# Patient Record
Sex: FEMALE | Race: WHITE | NOT HISPANIC OR LATINO | Employment: FULL TIME | URBAN - METROPOLITAN AREA
[De-identification: names, ages, dates, MRNs, and addresses within clinical notes are randomized per-mention and may not be internally consistent; named-entity substitution may affect disease eponyms.]

---

## 2017-06-07 ENCOUNTER — TRANSCRIBE ORDERS (OUTPATIENT)
Dept: ADMINISTRATIVE | Facility: HOSPITAL | Age: 47
End: 2017-06-07

## 2017-06-07 ENCOUNTER — HOSPITAL ENCOUNTER (OUTPATIENT)
Dept: MAMMOGRAPHY | Facility: MEDICAL CENTER | Age: 47
Discharge: HOME | End: 2017-06-07
Payer: COMMERCIAL

## 2017-06-07 DIAGNOSIS — Z12.31 VISIT FOR SCREENING MAMMOGRAM: Primary | ICD-10-CM

## 2017-06-07 DIAGNOSIS — Z12.31 VISIT FOR SCREENING MAMMOGRAM: ICD-10-CM

## 2017-06-07 PROCEDURE — G0202 SCR MAMMO BI INCL CAD: HCPCS

## 2017-06-07 PROCEDURE — 77063 BREAST TOMOSYNTHESIS BI: CPT

## 2018-06-14 ENCOUNTER — TRANSCRIBE ORDERS (OUTPATIENT)
Dept: ADMINISTRATIVE | Facility: HOSPITAL | Age: 48
End: 2018-06-14

## 2018-06-14 ENCOUNTER — HOSPITAL ENCOUNTER (OUTPATIENT)
Dept: MAMMOGRAPHY | Facility: MEDICAL CENTER | Age: 48
Discharge: HOME | End: 2018-06-14
Payer: COMMERCIAL

## 2018-06-14 DIAGNOSIS — Z12.31 VISIT FOR SCREENING MAMMOGRAM: ICD-10-CM

## 2018-06-14 DIAGNOSIS — Z12.31 VISIT FOR SCREENING MAMMOGRAM: Primary | ICD-10-CM

## 2018-06-14 PROCEDURE — 77063 BREAST TOMOSYNTHESIS BI: CPT

## 2018-06-14 PROCEDURE — 77067 SCR MAMMO BI INCL CAD: CPT

## 2024-02-29 ENCOUNTER — APPOINTMENT (OUTPATIENT)
Dept: LAB | Facility: CLINIC | Age: 54
End: 2024-02-29

## 2024-02-29 ENCOUNTER — APPOINTMENT (OUTPATIENT)
Dept: URGENT CARE | Facility: CLINIC | Age: 54
End: 2024-02-29

## 2024-02-29 ENCOUNTER — TRANSCRIBE ORDERS (OUTPATIENT)
Dept: URGENT CARE | Facility: CLINIC | Age: 54
End: 2024-02-29

## 2024-02-29 DIAGNOSIS — Z02.1 PRE-EMPLOYMENT EXAMINATION: ICD-10-CM

## 2024-02-29 DIAGNOSIS — Z02.1 PRE-EMPLOYMENT EXAMINATION: Primary | ICD-10-CM

## 2024-02-29 LAB — RUBV IGG SERPL IA-ACNC: 146.7 IU/ML

## 2024-02-29 PROCEDURE — 86787 VARICELLA-ZOSTER ANTIBODY: CPT

## 2024-02-29 PROCEDURE — 36415 COLL VENOUS BLD VENIPUNCTURE: CPT

## 2024-02-29 PROCEDURE — 86762 RUBELLA ANTIBODY: CPT

## 2024-02-29 PROCEDURE — 86735 MUMPS ANTIBODY: CPT

## 2024-02-29 PROCEDURE — 86765 RUBEOLA ANTIBODY: CPT

## 2024-02-29 PROCEDURE — 86480 TB TEST CELL IMMUN MEASURE: CPT

## 2024-03-01 LAB
GAMMA INTERFERON BACKGROUND BLD IA-ACNC: 0.02 IU/ML
M TB IFN-G BLD-IMP: NEGATIVE
M TB IFN-G CD4+ BCKGRND COR BLD-ACNC: 0.01 IU/ML
M TB IFN-G CD4+ BCKGRND COR BLD-ACNC: 0.01 IU/ML
MEV IGG SER QL IA: NORMAL
MITOGEN IGNF BCKGRD COR BLD-ACNC: 9.98 IU/ML
MUV IGG SER QL IA: NORMAL
VZV IGG SER QL IA: NORMAL

## 2024-05-31 DIAGNOSIS — Z00.6 ENCOUNTER FOR EXAMINATION FOR NORMAL COMPARISON OR CONTROL IN CLINICAL RESEARCH PROGRAM: ICD-10-CM

## 2024-06-17 ENCOUNTER — APPOINTMENT (OUTPATIENT)
Dept: LAB | Age: 54
End: 2024-06-17

## 2024-06-17 DIAGNOSIS — Z00.8 HEALTH EXAMINATION IN POPULATION SURVEY: ICD-10-CM

## 2024-06-17 DIAGNOSIS — Z00.6 ENCOUNTER FOR EXAMINATION FOR NORMAL COMPARISON OR CONTROL IN CLINICAL RESEARCH PROGRAM: ICD-10-CM

## 2024-06-17 LAB
CHOLEST SERPL-MCNC: 178 MG/DL
EST. AVERAGE GLUCOSE BLD GHB EST-MCNC: 128 MG/DL
HBA1C MFR BLD: 6.1 %
HDLC SERPL-MCNC: 71 MG/DL
LDLC SERPL CALC-MCNC: 91 MG/DL (ref 0–100)
NONHDLC SERPL-MCNC: 107 MG/DL
TRIGL SERPL-MCNC: 81 MG/DL

## 2024-06-17 PROCEDURE — 36415 COLL VENOUS BLD VENIPUNCTURE: CPT

## 2024-06-17 PROCEDURE — 83036 HEMOGLOBIN GLYCOSYLATED A1C: CPT

## 2024-06-17 PROCEDURE — 80061 LIPID PANEL: CPT

## 2024-06-19 ENCOUNTER — OFFICE VISIT (OUTPATIENT)
Dept: FAMILY MEDICINE CLINIC | Facility: CLINIC | Age: 54
End: 2024-06-19
Payer: COMMERCIAL

## 2024-06-19 VITALS
RESPIRATION RATE: 17 BRPM | OXYGEN SATURATION: 98 % | WEIGHT: 197 LBS | DIASTOLIC BLOOD PRESSURE: 84 MMHG | HEART RATE: 89 BPM | TEMPERATURE: 98.4 F | HEIGHT: 66 IN | SYSTOLIC BLOOD PRESSURE: 126 MMHG | BODY MASS INDEX: 31.66 KG/M2

## 2024-06-19 DIAGNOSIS — E66.09 CLASS 1 OBESITY DUE TO EXCESS CALORIES WITH SERIOUS COMORBIDITY AND BODY MASS INDEX (BMI) OF 31.0 TO 31.9 IN ADULT: ICD-10-CM

## 2024-06-19 DIAGNOSIS — R73.01 IMPAIRED FASTING BLOOD SUGAR: ICD-10-CM

## 2024-06-19 DIAGNOSIS — E06.3 HYPOTHYROIDISM DUE TO HASHIMOTO'S THYROIDITIS: Primary | ICD-10-CM

## 2024-06-19 DIAGNOSIS — E03.8 HYPOTHYROIDISM DUE TO HASHIMOTO'S THYROIDITIS: Primary | ICD-10-CM

## 2024-06-19 PROBLEM — E66.811 CLASS 1 OBESITY DUE TO EXCESS CALORIES WITH SERIOUS COMORBIDITY AND BODY MASS INDEX (BMI) OF 31.0 TO 31.9 IN ADULT: Status: ACTIVE | Noted: 2024-06-19

## 2024-06-19 PROCEDURE — 99204 OFFICE O/P NEW MOD 45 MIN: CPT | Performed by: NURSE PRACTITIONER

## 2024-06-19 RX ORDER — IRON HEME POLYPEPTIDE/FOLIC AC 12-1MG
5000 TABLET ORAL
COMMUNITY

## 2024-06-19 RX ORDER — LEVOTHYROXINE SODIUM 0.12 MG/1
125 TABLET ORAL
COMMUNITY
End: 2024-06-19

## 2024-06-19 RX ORDER — LEVOTHYROXINE SODIUM 0.12 MG/1
125 TABLET ORAL
Qty: 102 TABLET | Refills: 3 | Status: SHIPPED | OUTPATIENT
Start: 2024-06-19

## 2024-06-19 RX ORDER — LEVOTHYROXINE SODIUM 0.12 MG/1
125 TABLET ORAL DAILY
COMMUNITY
Start: 2024-06-06 | End: 2024-06-19

## 2024-06-19 NOTE — PROGRESS NOTES
FAMILY PRACTICE OFFICE VISIT       NAME: Shital Abbasi  AGE: 54 y.o. SEX: female       : 1970        MRN: [id number]    DATE: 2024  TIME: 12:07 AM    Assessment and Plan   1. Hypothyroidism due to Hashimoto's thyroiditis  -     levothyroxine (Synthroid) 125 mcg tablet; Take 1 tablet (125 mcg total) by mouth every 7 days One day of the week 62.5 mcg  extra  -     Comprehensive metabolic panel; Future  -     Hemoglobin A1C; Future  -     TSH, 3rd generation with Free T4 reflex; Future  2. Impaired fasting blood sugar  -     Comprehensive metabolic panel; Future  -     Hemoglobin A1C; Future  3. Class 1 obesity due to excess calories with serious comorbidity and body mass index (BMI) of 31.0 to 31.9 in adult  -     Comprehensive metabolic panel; Future  -     Hemoglobin A1C; Future       There are no Patient Instructions on file for this visit.        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   HENT:  Negative for congestion, postnasal drip and rhinorrhea.    Eyes:  Negative for photophobia and visual disturbance.   Respiratory:  Negative for cough, shortness of breath and wheezing.    Cardiovascular:  Negative for chest pain and palpitations.   Gastrointestinal:  Negative for constipation, diarrhea, nausea and vomiting.   Genitourinary:  Negative for dysuria and frequency.   Musculoskeletal:  Negative for arthralgias and myalgias.   Skin:  Negative for rash.   Neurological:  Negative for  dizziness, light-headedness and headaches.   Hematological:  Negative for adenopathy.   Psychiatric/Behavioral:  Negative for dysphoric mood and sleep disturbance. The patient is not nervous/anxious and is not hyperactive.        Active Problem List     Patient Active Problem List   Diagnosis    Class 1 obesity due to excess calories with serious comorbidity and body mass index (BMI) of 31.0 to 31.9 in adult    Impaired fasting blood sugar    Hypothyroidism due to Hashimoto's thyroiditis    Beta thalassemia (HCC)    Exercise-induced asthma    Family history of pulmonary hypertension    Hypothyroidism    Menorrhagia with irregular cycle    Vitamin D deficiency         Past Medical History:  Past Medical History:   Diagnosis Date    Anemia     Beta thallasemia    Asthma     Exercise induced    Disease of thyroid gland 1999    Hypothyroidism    GERD (gastroesophageal reflux disease)     Intermittently    Obesity        Past Surgical History:  Past Surgical History:   Procedure Laterality Date    BLADDER SURGERY  2012    Bladder lift  and rectocele repair    KNEE SURGERY      Arthroscopy       Family History:  Family History   Problem Relation Age of Onset    Diabetes Mother         Type 2    COPD Mother     Completed Suicide  Father         GSW    Hypertension Brother         pulmonary HTN    COPD Brother     Thalassemia Daughter        Social History:  Social History     Socioeconomic History    Marital status: Single     Spouse name: Not on file    Number of children: Not on file    Years of education: Not on file    Highest education level: Not on file   Occupational History    Not on file   Tobacco Use    Smoking status: Never    Smokeless tobacco: Never   Vaping Use    Vaping status: Never Used   Substance and Sexual Activity    Alcohol use: Not Currently     Comment: Socially    Drug use: Never    Sexual activity: Yes     Partners: Male     Birth control/protection: Male Sterilization   Other Topics Concern     Not on file   Social History Narrative    Not on file     Social Determinants of Health     Financial Resource Strain: Not on file   Food Insecurity: Not on file   Transportation Needs: Not on file   Physical Activity: Not on file   Stress: Not on file   Social Connections: Not on file   Intimate Partner Violence: Not on file   Housing Stability: Not on file       Objective     Vitals:    06/19/24 1414   BP: 126/84   Pulse: 89   Resp: 17   Temp: 98.4 °F (36.9 °C)   SpO2: 98%     Wt Readings from Last 3 Encounters:   06/19/24 89.4 kg (197 lb)       Physical Exam  Vitals and nursing note reviewed.   Constitutional:       Appearance: Normal appearance.   HENT:      Head: Normocephalic and atraumatic.      Right Ear: Tympanic membrane, ear canal and external ear normal.      Left Ear: Tympanic membrane, ear canal and external ear normal.      Nose: Nose normal.      Mouth/Throat:      Mouth: Mucous membranes are moist.   Eyes:      Conjunctiva/sclera: Conjunctivae normal.      Pupils: Pupils are equal, round, and reactive to light.   Cardiovascular:      Rate and Rhythm: Normal rate and regular rhythm.      Pulses: Normal pulses.      Heart sounds: Normal heart sounds.   Pulmonary:      Effort: Pulmonary effort is normal.      Breath sounds: Normal breath sounds.   Abdominal:      General: Bowel sounds are normal.      Palpations: Abdomen is soft.   Musculoskeletal:         General: Normal range of motion.      Cervical back: Normal range of motion and neck supple.   Skin:     General: Skin is warm and dry.      Capillary Refill: Capillary refill takes less than 2 seconds.   Neurological:      General: No focal deficit present.      Mental Status: She is alert and oriented to person, place, and time.   Psychiatric:         Mood and Affect: Mood normal.         Behavior: Behavior normal.         Thought Content: Thought content normal.         Judgment: Judgment normal.         Pertinent Laboratory/Diagnostic  "Studies:  Lab Results   Component Value Date    BUN 24 10/04/2023    CREATININE 0.90 10/04/2023    CALCIUM 9.5 10/04/2023    K 4.2 10/04/2023    CO2 31 10/04/2023     10/04/2023     Lab Results   Component Value Date    ALT 33 10/04/2023    AST 21 10/04/2023    ALKPHOS 88 10/04/2023       No results found for: \"WBC\", \"HGB\", \"HCT\", \"MCV\", \"PLT\"    Lab Results   Component Value Date    TSH 2.64 10/04/2023       No results found for: \"CHOL\"  Lab Results   Component Value Date    TRIG 81 06/17/2024     Lab Results   Component Value Date    HDL 71 06/17/2024     Lab Results   Component Value Date    LDLCALC 91 06/17/2024     Lab Results   Component Value Date    HGBA1C 6.1 (H) 06/17/2024       Results for orders placed or performed in visit on 06/17/24   Lipid panel   Result Value Ref Range    Cholesterol 178 See Comment mg/dL    Triglycerides 81 See Comment mg/dL    HDL, Direct 71 >=50 mg/dL    LDL Calculated 91 0 - 100 mg/dL    Non-HDL-Chol (CHOL-HDL) 107 mg/dl   Hemoglobin A1C   Result Value Ref Range    Hemoglobin A1C 6.1 (H) Normal 4.0-5.6%; PreDiabetic 5.7-6.4%; Diabetic >=6.5%; Glycemic control for adults with diabetes <7.0% %     mg/dl       Orders Placed This Encounter   Procedures    Comprehensive metabolic panel    Hemoglobin A1C    TSH, 3rd generation with Free T4 reflex       ALLERGIES:  No Known Allergies    Current Medications     Current Outpatient Medications   Medication Sig Dispense Refill    ALBUTEROL IN       B Complex Vitamins (B COMPLEX 1 PO)       Cholecalciferol (Dialyvite Vitamin D 5000) 125 MCG (5000 UT) capsule 5,000 Units      Cholecalciferol 125 MCG (5000 UT) TABS Take 5,000 Units by mouth daily      Doxylamine Succinate, Sleep, (UNISOM PO)       levothyroxine (Synthroid) 125 mcg tablet Take 1 tablet (125 mcg total) by mouth every 7 days One day of the week 62.5 mcg  extra 102 tablet 3     No current facility-administered medications for this visit.         Health Maintenance "     Health Maintenance   Topic Date Due    Hepatitis C Screening  Never done    HIV Screening  Never done    Annual Physical  Never done    Cervical Cancer Screening  Never done    Colorectal Cancer Screening  Never done    DTaP,Tdap,and Td Vaccines (2 - Td or Tdap) 02/03/2019    Pneumococcal Vaccine: Pediatrics (0 to 5 Years) and At-Risk Patients (6 to 64 Years) (1 of 2 - PCV) 06/22/2025 (Originally 2/12/1976)    Breast Cancer Screening: Mammogram  12/19/2024    Depression Screening  06/19/2025    RSV Vaccine Age 60+ Years (1 - 1-dose 60+ series) 02/12/2031    Zoster Vaccine  Completed    Influenza Vaccine  Completed    COVID-19 Vaccine  Completed    RSV Vaccine age 0-20 Months  Aged Out    HIB Vaccine  Aged Out    IPV Vaccine  Aged Out    Hepatitis A Vaccine  Aged Out    Meningococcal ACWY Vaccine  Aged Out    HPV Vaccine  Aged Out     Immunization History   Administered Date(s) Administered    COVID-19 MODERNA VACC 0.5 ML IM 04/01/2022    COVID-19 Moderna Vac BIVALENT 12 Yr+ IM 0.5 ML 01/05/2023    COVID-19 PFIZER VACCINE 0.3 ML IM 12/23/2020, 01/15/2021, 10/01/2021    COVID-19 Pfizer mRNA vacc PF petra-sucrose 12 yr and older (Comirnaty) 12/14/2023    H1N1, All Formulations 11/22/2009    INFLUENZA 10/17/2014, 10/01/2016, 10/17/2017, 10/08/2018, 10/11/2019, 10/07/2020, 10/20/2021, 10/28/2022, 10/26/2023    Tdap 02/03/2009    Zoster Vaccine Recombinant 11/13/2021, 11/07/2023       Depression Screening and Follow-up Plan: Patient was screened for depression during today's encounter. They screened negative with a PHQ-2 score of 0.        NEENA Mccann

## 2024-06-22 PROBLEM — E55.9 VITAMIN D DEFICIENCY: Status: ACTIVE | Noted: 2018-07-19

## 2024-06-22 PROBLEM — Z82.49 FAMILY HISTORY OF PULMONARY HYPERTENSION: Status: ACTIVE | Noted: 2020-05-19

## 2024-06-22 PROBLEM — D56.1 BETA THALASSEMIA (HCC): Status: ACTIVE | Noted: 2019-08-26

## 2024-06-22 PROBLEM — N92.1 MENORRHAGIA WITH IRREGULAR CYCLE: Status: ACTIVE | Noted: 2023-02-15

## 2024-06-28 LAB
APOB+LDLR+PCSK9 GENE MUT ANL BLD/T: NOT DETECTED
BRCA1+BRCA2 DEL+DUP + FULL MUT ANL BLD/T: NOT DETECTED
MLH1+MSH2+MSH6+PMS2 GN DEL+DUP+FUL M: NOT DETECTED

## 2024-07-02 DIAGNOSIS — E66.09 CLASS 1 OBESITY DUE TO EXCESS CALORIES WITH SERIOUS COMORBIDITY AND BODY MASS INDEX (BMI) OF 31.0 TO 31.9 IN ADULT: Primary | ICD-10-CM

## 2024-07-03 ENCOUNTER — TELEPHONE (OUTPATIENT)
Dept: FAMILY MEDICINE CLINIC | Facility: CLINIC | Age: 54
End: 2024-07-03

## 2024-07-03 NOTE — TELEPHONE ENCOUNTER
Fax received for prior auth request for Wegovy  Key: [id number]  Please initiate prior auth. Thank you

## 2024-07-09 ENCOUNTER — TELEPHONE (OUTPATIENT)
Age: 54
End: 2024-07-09

## 2024-07-09 NOTE — TELEPHONE ENCOUNTER
I just wanted to check if I needed to do anything regarding the prior auth for this medication. The pharmacy did let me know they received it but that the office needed to initiate a prior auth. Are you able to let me know the status?   Thanks so much!   Rosa Abbasi

## 2024-07-09 NOTE — TELEPHONE ENCOUNTER
PA for WEGOVY 0.25 MG/0.5 ML SUBMITTED    Submitted via    []CMM-KEY   [x][employer]-Case ID # [id number]   []Faxed to plan   []Other website   []Phone call Case ID #     Office notes sent, clinical questions answered. Awaiting determination    Turnaround time for your insurance to make a decision on your Prior Authorization can take 7-21 business days.

## 2024-07-12 NOTE — TELEPHONE ENCOUNTER
Duplicate encounter created, please see telephone encounter from 7-9-24 regarding WEGOVY PA status.

## 2024-07-15 NOTE — TELEPHONE ENCOUNTER
PA for Semaglutide-Weight Management (WEGOVY) 0.25 MG/0.5ML Approved     Date(s) approved 7-9-2024 - 7-9-2025        Patient advised by          [x] [place] Message  [] Phone call   []LMOM  []L/M to call office as no active Communication consent on file  []Unable to leave detailed message as VM not approved on Communication consent       Pharmacy advised by    [x]Fax  []Phone call    Approval letter scanned into Media Yes

## 2024-07-31 ENCOUNTER — OFFICE VISIT (OUTPATIENT)
Dept: OBGYN CLINIC | Facility: CLINIC | Age: 54
End: 2024-07-31
Payer: COMMERCIAL

## 2024-07-31 VITALS
SYSTOLIC BLOOD PRESSURE: 138 MMHG | HEIGHT: 66 IN | BODY MASS INDEX: 31.18 KG/M2 | WEIGHT: 194 LBS | DIASTOLIC BLOOD PRESSURE: 92 MMHG

## 2024-07-31 DIAGNOSIS — Z72.3 INADEQUATE EXERCISE: ICD-10-CM

## 2024-07-31 DIAGNOSIS — Z12.31 VISIT FOR SCREENING MAMMOGRAM: ICD-10-CM

## 2024-07-31 DIAGNOSIS — Z01.419 ENCOUNTER FOR ANNUAL ROUTINE GYNECOLOGICAL EXAMINATION: Primary | ICD-10-CM

## 2024-07-31 DIAGNOSIS — E66.09 CLASS 1 OBESITY DUE TO EXCESS CALORIES WITH SERIOUS COMORBIDITY AND BODY MASS INDEX (BMI) OF 31.0 TO 31.9 IN ADULT: Primary | ICD-10-CM

## 2024-07-31 DIAGNOSIS — E58 DIETARY CALCIUM DEFICIENCY: ICD-10-CM

## 2024-07-31 PROCEDURE — S0610 ANNUAL GYNECOLOGICAL EXAMINA: HCPCS | Performed by: OBSTETRICS & GYNECOLOGY

## 2024-07-31 RX ORDER — SEMAGLUTIDE 0.5 MG/.5ML
INJECTION, SOLUTION SUBCUTANEOUS
Qty: 2 ML | Refills: 0 | Status: SHIPPED | OUTPATIENT
Start: 2024-07-31

## 2024-07-31 NOTE — PROGRESS NOTES
NEW PATIENT    Pt is a54 y.o.  ,menopausal, who presents for preventive care  Partner same ; lifetime  1 partner         safe sexual practices followed: yes     does feel safe in relationship:yes    Dairy: 1 c milk 3-4x/week, 1 cheese daily  Vitamin D: takes supplement  Exercise: 3x/week  Pap: 2/15/2023-wnl, HRHPV neg, repeat   Mammogram: 2023-wnl, repeat rx given  Colonoscopy: wnl, repeat 10 years  DEXA: not indicated  safety at home:  yes  tobacco use No    Past Medical History:   Diagnosis Date    Anemia     Beta thallasemia    Asthma     Exercise induced    Disease of thyroid gland     Hypothyroidism    GERD (gastroesophageal reflux disease)     Intermittently    Obesity     Pap smear for cervical cancer screening     denies h.o abnormal; 2023-wnl, HRHPV neg       Past Surgical History:   Procedure Laterality Date    BLADDER SURGERY      Bladder lift  and rectocele repair    COLONOSCOPY      wnl, repeat 10 years    KNEE SURGERY Left     Arthroscopy    WISDOM TOOTH EXTRACTION         Ob Hx:   OB History    Para Term  AB Living   2 2 2     2   SAB IAB Ectopic Multiple Live Births           2      # Outcome Date GA Lbr Luciano/2nd Weight Sex Type Anes PTL Lv   2 Term     F Vag-Spont   JUAN DIEGO   1 Term     F Vag-Spont   JUAN DIEGO      Obstetric Comments   Menarche: 13      Menopause: 54           Current Outpatient Medications:     ALBUTEROL IN, , Disp: , Rfl:     B Complex Vitamins (B COMPLEX 1 PO), , Disp: , Rfl:     Cholecalciferol (Dialyvite Vitamin D 5000) 125 MCG (5000 UT) capsule, 5,000 Units, Disp: , Rfl:     Doxylamine Succinate, Sleep, (UNISOM PO), , Disp: , Rfl:     levothyroxine (Synthroid) 125 mcg tablet, Take 1 tablet (125 mcg total) by mouth every 7 days One day of the week 62.5 mcg  extra, Disp: 102 tablet, Rfl: 3    Semaglutide-Weight Management (WEGOVY) 0.25 MG/0.5ML, Inject 0.5 mL (0.25 mg total) under the skin once a week, Disp: 2 mL, Rfl: 0    No Known  "Allergies    Social History     Socioeconomic History    Marital status: /Civil Union     Spouse name: Anmol    Number of children: 2    Years of education: None    Highest education level: Bachelor's degree (e.g., BA, AB, BS)   Occupational History    Occupation: Nurse   Tobacco Use    Smoking status: Never    Smokeless tobacco: Never   Vaping Use    Vaping status: Never Used   Substance and Sexual Activity    Alcohol use: Not Currently     Alcohol/week: 0.0 - 1.0 standard drinks of alcohol     Comment: Socially-once monthly    Drug use: Never    Sexual activity: Yes     Partners: Male     Birth control/protection: Male Sterilization     Comment: lifetime partners: 1   Other Topics Concern    None   Social History Narrative    [employer]: [employer]    Accepts blood products        Exercise: 3x/week    Calcium: 1 c milk 3-4x/week, 1 cheese daily     Social Determinants of Health     Financial Resource Strain: Not on file   Food Insecurity: Not on file   Transportation Needs: Not on file   Physical Activity: Not on file   Stress: Not on file   Social Connections: Not on file   Intimate Partner Violence: Not on file   Housing Stability: Not on file       Family History   Problem Relation Age of Onset    Diabetes Mother         Type 2    COPD Mother     Completed Suicide  Father         GSW    Hashimoto's thyroiditis Sister     Hypertension Brother         pulmonary HTN    COPD Brother     Breast cancer Maternal Grandmother 80    No Known Problems Maternal Grandfather     No Known Problems Paternal Grandmother     No Known Problems Paternal Grandfather     Thalassemia Daughter     Thalassemia Daughter     Ovarian cancer Neg Hx     Colon cancer Neg Hx        Blood pressure 138/92, height 5' 6\" (1.676 m), weight 88 kg (194 lb), last menstrual period 07/11/2023. and Body mass index is 31.31 kg/m².    Physical Exam  Constitutional:       General: She is not in acute distress.     Appearance: Normal appearance. " She is well-developed. She is obese. She is not ill-appearing.   HENT:      Head: Normocephalic and atraumatic.   Eyes:      Extraocular Movements: Extraocular movements intact.      Conjunctiva/sclera: Conjunctivae normal.   Neck:      Thyroid: No thyromegaly.      Trachea: No tracheal deviation.   Cardiovascular:      Rate and Rhythm: Normal rate and regular rhythm.      Heart sounds: Normal heart sounds.   Pulmonary:      Effort: Pulmonary effort is normal. No respiratory distress.      Breath sounds: Normal breath sounds. No stridor. No wheezing or rales.   Abdominal:      General: Bowel sounds are normal. There is no distension.      Palpations: Abdomen is soft. There is no mass.      Tenderness: There is no abdominal tenderness. There is no guarding or rebound.      Hernia: No hernia is present.   Musculoskeletal:         General: No tenderness. Normal range of motion.      Cervical back: Normal range of motion and neck supple.   Lymphadenopathy:      Cervical: No cervical adenopathy.   Skin:     General: Skin is warm.      Findings: No erythema or rash.   Neurological:      Mental Status: She is alert and oriented to person, place, and time.   Psychiatric:         Mood and Affect: Mood normal.         Behavior: Behavior normal.         Thought Content: Thought content normal.         Judgment: Judgment normal.          Breasts: breasts appear normal, no suspicious masses, no skin or nipple changes or axillary nodes, symmetric fibrous changes in both upper outer quadrants.      vulva: normal external genitalia for age and no lesions, masses, epithelial changes, or exudate  vagina: color pale and rugae  flattening of rugae  cervix: parous and no lesions   uterus: NSSC, AF, NT, mobile  adnexa: no masses or tenderness  rectum: no masses or nodularity    A/P:  Pt is a 54 y.o.  with       Shital was seen today for gynecologic exam.    Diagnoses and all orders for this visit:    Encounter for annual routine  gynecological examination  -normal examination  -pap up to date  -colonoscopy up to date    Visit for screening mammogram  -     Mammo screening bilateral w 3d & cad; Future    Dietary calcium deficiency  Patient advised recommendation of daily dietary calcium of 1200 mg calcium.     Inadequate exercise  Patient advised recommendation of exercise 5 times per week for 30 minutes.    BMI 31.0-31.9,adult  Patient advised recommendation of BMI to be between 19-25.

## 2024-08-30 DIAGNOSIS — E66.09 CLASS 1 OBESITY DUE TO EXCESS CALORIES WITH SERIOUS COMORBIDITY AND BODY MASS INDEX (BMI) OF 31.0 TO 31.9 IN ADULT: ICD-10-CM

## 2024-09-03 NOTE — TELEPHONE ENCOUNTER
Requested medication(s) are due for refill today: Yes  Patient has already received a courtesy refill: No  Other reason request has been forwarded to provider: PLEASE INCREASE DOSE

## 2024-09-04 RX ORDER — SEMAGLUTIDE 0.5 MG/.5ML
INJECTION, SOLUTION SUBCUTANEOUS
Qty: 2 ML | Refills: 3 | Status: SHIPPED | OUTPATIENT
Start: 2024-09-04

## 2024-09-14 ENCOUNTER — APPOINTMENT (OUTPATIENT)
Dept: LAB | Facility: HOSPITAL | Age: 54
End: 2024-09-14
Payer: COMMERCIAL

## 2024-09-14 DIAGNOSIS — E03.8 HYPOTHYROIDISM DUE TO HASHIMOTO'S THYROIDITIS: ICD-10-CM

## 2024-09-14 DIAGNOSIS — E06.3 HYPOTHYROIDISM DUE TO HASHIMOTO'S THYROIDITIS: ICD-10-CM

## 2024-09-14 DIAGNOSIS — E66.09 CLASS 1 OBESITY DUE TO EXCESS CALORIES WITH SERIOUS COMORBIDITY AND BODY MASS INDEX (BMI) OF 31.0 TO 31.9 IN ADULT: ICD-10-CM

## 2024-09-14 DIAGNOSIS — R73.01 IMPAIRED FASTING BLOOD SUGAR: ICD-10-CM

## 2024-09-14 LAB
ALBUMIN SERPL BCG-MCNC: 4.3 G/DL (ref 3.5–5)
ALP SERPL-CCNC: 96 U/L (ref 34–104)
ALT SERPL W P-5'-P-CCNC: 12 U/L (ref 7–52)
ANION GAP SERPL CALCULATED.3IONS-SCNC: 7 MMOL/L (ref 4–13)
AST SERPL W P-5'-P-CCNC: 15 U/L (ref 13–39)
BILIRUB SERPL-MCNC: 0.76 MG/DL (ref 0.2–1)
BUN SERPL-MCNC: 16 MG/DL (ref 5–25)
CALCIUM SERPL-MCNC: 9.9 MG/DL (ref 8.4–10.2)
CHLORIDE SERPL-SCNC: 103 MMOL/L (ref 96–108)
CO2 SERPL-SCNC: 29 MMOL/L (ref 21–32)
CREAT SERPL-MCNC: 0.88 MG/DL (ref 0.6–1.3)
EST. AVERAGE GLUCOSE BLD GHB EST-MCNC: 137 MG/DL
GFR SERPL CREATININE-BSD FRML MDRD: 75 ML/MIN/1.73SQ M
GLUCOSE P FAST SERPL-MCNC: 87 MG/DL (ref 65–99)
HBA1C MFR BLD: 6.4 %
POTASSIUM SERPL-SCNC: 4.1 MMOL/L (ref 3.5–5.3)
PROT SERPL-MCNC: 7.5 G/DL (ref 6.4–8.4)
SODIUM SERPL-SCNC: 139 MMOL/L (ref 135–147)
T4 FREE SERPL-MCNC: 1.57 NG/DL (ref 0.61–1.12)
TSH SERPL DL<=0.05 MIU/L-ACNC: 0.05 UIU/ML (ref 0.45–4.5)

## 2024-09-14 PROCEDURE — 84443 ASSAY THYROID STIM HORMONE: CPT

## 2024-09-14 PROCEDURE — 83036 HEMOGLOBIN GLYCOSYLATED A1C: CPT

## 2024-09-14 PROCEDURE — 80053 COMPREHEN METABOLIC PANEL: CPT

## 2024-09-14 PROCEDURE — 84439 ASSAY OF FREE THYROXINE: CPT

## 2024-09-14 PROCEDURE — 36415 COLL VENOUS BLD VENIPUNCTURE: CPT

## 2024-09-16 ENCOUNTER — TELEPHONE (OUTPATIENT)
Dept: ADMINISTRATIVE | Facility: OTHER | Age: 54
End: 2024-09-16

## 2024-09-16 ENCOUNTER — OFFICE VISIT (OUTPATIENT)
Dept: FAMILY MEDICINE CLINIC | Facility: CLINIC | Age: 54
End: 2024-09-16
Payer: COMMERCIAL

## 2024-09-16 VITALS
SYSTOLIC BLOOD PRESSURE: 118 MMHG | RESPIRATION RATE: 17 BRPM | OXYGEN SATURATION: 99 % | BODY MASS INDEX: 30.05 KG/M2 | HEIGHT: 66 IN | HEART RATE: 94 BPM | TEMPERATURE: 97.8 F | WEIGHT: 187 LBS | DIASTOLIC BLOOD PRESSURE: 80 MMHG

## 2024-09-16 DIAGNOSIS — R73.01 IMPAIRED FASTING BLOOD SUGAR: ICD-10-CM

## 2024-09-16 DIAGNOSIS — E03.8 HYPOTHYROIDISM DUE TO HASHIMOTO'S THYROIDITIS: ICD-10-CM

## 2024-09-16 DIAGNOSIS — Z00.00 ANNUAL PHYSICAL EXAM: Primary | ICD-10-CM

## 2024-09-16 DIAGNOSIS — E06.3 HYPOTHYROIDISM DUE TO HASHIMOTO'S THYROIDITIS: ICD-10-CM

## 2024-09-16 DIAGNOSIS — E66.09 CLASS 1 OBESITY DUE TO EXCESS CALORIES WITH SERIOUS COMORBIDITY AND BODY MASS INDEX (BMI) OF 31.0 TO 31.9 IN ADULT: ICD-10-CM

## 2024-09-16 PROCEDURE — 99396 PREV VISIT EST AGE 40-64: CPT | Performed by: NURSE PRACTITIONER

## 2024-09-16 RX ORDER — SEMAGLUTIDE 1 MG/.5ML
INJECTION, SOLUTION SUBCUTANEOUS
Qty: 2 ML | Refills: 2 | Status: SHIPPED | OUTPATIENT
Start: 2024-09-16

## 2024-09-16 NOTE — TELEPHONE ENCOUNTER
----- Message from Joanna SCOTT sent at 9/16/2024  9:02 AM EDT -----  Regarding: Care Gap Request  09/16/24 9:02 AM    Hello, our patient above has had Mammogram completed/performed. Please assist in updating the patient chart by pulling the Care Everywhere (CE) document. The date of service is 12/19/2023.     Thank you,  JACKLYN Copeland [facility]

## 2024-09-16 NOTE — TELEPHONE ENCOUNTER
Upon review of the In Basket request we were able to locate, review, and update the patient chart as requested for CRC: Colonoscopy.    Any additional questions or concerns should be emailed to the Practice Liaisons via the appropriate education email address, please do not reply via In Basket.    Thank you  Jayna Figueroa MA   [facility]

## 2024-09-16 NOTE — TELEPHONE ENCOUNTER
----- Message from Joanna SCOTT sent at 9/16/2024  9:04 AM EDT -----  Regarding: Care Gap Request  09/16/24 9:04 AM    Hello, our patient above has had CRC: Colonoscopy completed/performed. Please assist in updating the patient chart by pulling the Care Everywhere (CE) document. The date of service is 03/29/2021.     Thank you,  JACKLYN Copeland PG

## 2024-09-16 NOTE — TELEPHONE ENCOUNTER
Upon review of the In Basket request we were able to locate, review, and update the patient chart as requested for Mammogram.    Any additional questions or concerns should be emailed to the Practice Liaisons via the appropriate education email address, please do not reply via In Basket.    Thank you  Jayna Figueroa MA   [facility]

## 2024-09-16 NOTE — PROGRESS NOTES
Adult Annual Physical  Name: Shital Abbasi      : 1970      MRN: [id number]  Encounter Provider: NEENA Mccann  Encounter Date: 2024   Encounter department: [facility]    Assessment & Plan  Annual physical exam         Class 1 obesity due to excess calories with serious comorbidity and body mass index (BMI) of 31.0 to 31.9 in adult    Orders:    Semaglutide-Weight Management (Wegovy) 1 MG/0.5ML; Inject 1 mg under the skin weekly    TSH, 3rd generation with Free T4 reflex; Future    Hemoglobin A1C; Future    Impaired fasting blood sugar    Orders:    TSH, 3rd generation with Free T4 reflex; Future    Hemoglobin A1C; Future    Hypothyroidism due to Hashimoto's thyroiditis    Orders:    TSH, 3rd generation with Free T4 reflex; Future    Hemoglobin A1C; Future    Immunizations and preventive care screenings were discussed with patient today. Appropriate education was printed on patient's after visit summary.    Counseling:  Alcohol/drug use: discussed moderation in alcohol intake, the recommendations for healthy alcohol use, and avoidance of illicit drug use.  Dental Health: discussed importance of regular tooth brushing, flossing, and dental visits.  Injury prevention: discussed safety/seat belts, safety helmets, smoke detectors, carbon dioxide detectors, and smoking near bedding or upholstery.  Sexual health: discussed sexually transmitted diseases, partner selection, use of condoms, avoidance of unintended pregnancy, and contraceptive alternatives.  Exercise: the importance of regular exercise/physical activity was discussed. Recommend exercise 3-5 times per week for at least 30 minutes.       Depression Screening and Follow-up Plan: Patient was screened for depression during today's encounter. They screened negative with a PHQ-2 score of 0.        History of Present Illness     Adult Annual Physical:  Patient presents for annual physical. Here for wellness exam  .     Diet  and Physical Activity:  - Diet/Nutrition: well balanced diet.  - Exercise: 3-4 times a week on average.    Depression Screening:  - PHQ-2 Score: 0    General Health:  - Sleep: sleeps well.  - Hearing: normal hearing right ear and normal hearing left ear.  - Vision: no vision problems.  - Dental: regular dental visits and brushes teeth once daily.    /GYN Health:  - Follows with GYN: yes.   - History of STDs: no    Advanced Care Planning:  - Has an advanced directive?: no    - Has a durable medical POA?: no    - ACP document given to patient?: no      Review of Systems   Constitutional:  Negative for fatigue and fever.   HENT:  Negative for congestion, postnasal drip and rhinorrhea.    Eyes:  Negative for photophobia and visual disturbance.   Respiratory:  Negative for cough, shortness of breath and wheezing.    Cardiovascular:  Negative for chest pain and palpitations.   Gastrointestinal:  Negative for constipation, diarrhea, nausea and vomiting.   Genitourinary:  Negative for dysuria and frequency.   Musculoskeletal:  Negative for arthralgias and myalgias.   Skin:  Negative for rash.   Neurological:  Negative for dizziness, light-headedness and headaches.   Hematological:  Negative for adenopathy.   Psychiatric/Behavioral:  Negative for dysphoric mood and sleep disturbance. The patient is not nervous/anxious.      Pertinent Medical History         Medical History Reviewed by provider this encounter:  Tobacco  Allergies  Meds  Problems  Med Hx  Surg Hx  Fam Hx       Past Medical History   Past Medical History:   Diagnosis Date    Anemia     Beta thallasemia    Asthma     Exercise induced    Disease of thyroid gland 1999    Hypothyroidism    GERD (gastroesophageal reflux disease)     Intermittently    Obesity     Pap smear for cervical cancer screening     denies h.o abnormal; 2/2023-wnl, HRHPV neg     Past Surgical History:   Procedure Laterality Date    BLADDER SURGERY  2012    Bladder lift  and rectocele  repair    COLONOSCOPY      2021-wnl, repeat 10 years    KNEE SURGERY Left     Arthroscopy    WISDOM TOOTH EXTRACTION       Family History   Problem Relation Age of Onset    Diabetes Mother         Type 2    COPD Mother     Completed Suicide  Father         GSW    Hashimoto's thyroiditis Sister     Hypertension Brother         pulmonary HTN    COPD Brother     Breast cancer Maternal Grandmother 80    No Known Problems Maternal Grandfather     No Known Problems Paternal Grandmother     No Known Problems Paternal Grandfather     Thalassemia Daughter     Thalassemia Daughter     Ovarian cancer Neg Hx     Colon cancer Neg Hx      Current Outpatient Medications on File Prior to Visit   Medication Sig Dispense Refill    ALBUTEROL IN       B Complex Vitamins (B COMPLEX 1 PO)       Cholecalciferol (Dialyvite Vitamin D 5000) 125 MCG (5000 UT) capsule 5,000 Units      Doxylamine Succinate, Sleep, (UNISOM PO)       levothyroxine (Synthroid) 125 mcg tablet Take 1 tablet (125 mcg total) by mouth every 7 days One day of the week 62.5 mcg  extra 102 tablet 3    [DISCONTINUED] Semaglutide-Weight Management (Wegovy) 0.5 MG/0.5ML Inject 0.5 mg under the skin weekly 2 mL 3     No current facility-administered medications on file prior to visit.   No Known Allergies   Current Outpatient Medications on File Prior to Visit   Medication Sig Dispense Refill    ALBUTEROL IN       B Complex Vitamins (B COMPLEX 1 PO)       Cholecalciferol (Dialyvite Vitamin D 5000) 125 MCG (5000 UT) capsule 5,000 Units      Doxylamine Succinate, Sleep, (UNISOM PO)       levothyroxine (Synthroid) 125 mcg tablet Take 1 tablet (125 mcg total) by mouth every 7 days One day of the week 62.5 mcg  extra 102 tablet 3    [DISCONTINUED] Semaglutide-Weight Management (Wegovy) 0.5 MG/0.5ML Inject 0.5 mg under the skin weekly 2 mL 3     No current facility-administered medications on file prior to visit.      Social History     Tobacco Use    Smoking status: Never     "Smokeless tobacco: Never   Vaping Use    Vaping status: Never Used   Substance and Sexual Activity    Alcohol use: Not Currently     Alcohol/week: 0.0 - 1.0 standard drinks of alcohol     Comment: Socially-once monthly    Drug use: Never    Sexual activity: Yes     Partners: Male     Birth control/protection: Male Sterilization     Comment: lifetime partners: 1       Objective     /80 (BP Location: Left arm, Patient Position: Sitting, Cuff Size: Standard)   Pulse 94   Temp 97.8 °F (36.6 °C) (Tympanic)   Resp 17   Ht 5' 6.46\" (1.688 m)   Wt 84.8 kg (187 lb)   LMP 07/11/2023 (Exact Date)   SpO2 99%   BMI 29.77 kg/m²     Physical Exam  Vitals and nursing note reviewed.   Constitutional:       Appearance: Normal appearance.   HENT:      Head: Normocephalic and atraumatic.      Right Ear: Tympanic membrane, ear canal and external ear normal.      Left Ear: Tympanic membrane, ear canal and external ear normal.      Nose: Nose normal.      Mouth/Throat:      Mouth: Mucous membranes are moist.   Eyes:      Conjunctiva/sclera: Conjunctivae normal.   Cardiovascular:      Rate and Rhythm: Normal rate and regular rhythm.      Pulses: Normal pulses.      Heart sounds: Normal heart sounds.   Pulmonary:      Effort: Pulmonary effort is normal.   Abdominal:      General: Bowel sounds are normal.      Palpations: Abdomen is soft.   Musculoskeletal:         General: Normal range of motion.      Cervical back: Normal range of motion and neck supple.   Skin:     General: Skin is warm and dry.      Capillary Refill: Capillary refill takes less than 2 seconds.   Neurological:      General: No focal deficit present.      Mental Status: She is alert and oriented to person, place, and time.   Psychiatric:         Mood and Affect: Mood normal.         Behavior: Behavior normal.         Thought Content: Thought content normal.         Judgment: Judgment normal.       Administrative Statements   I have spent a total time of 20 " minutes in caring for this patient on the day of the visit/encounter including Diagnostic results, Prognosis, Risks and benefits of tx options, Instructions for management, Patient and family education, Importance of tx compliance, Risk factor reductions, Impressions, Counseling / Coordination of care, Documenting in the medical record, Reviewing / ordering tests, medicine, procedures  , and Obtaining or reviewing history  .

## 2024-09-16 NOTE — PATIENT INSTRUCTIONS
"Patient Education     Routine physical for adults   The Basics   Written by the doctors and editors at [facility]   What is a physical? -- A physical is a routine visit, or \"check-up,\" with your doctor. You might also hear it called a \"wellness visit\" or \"preventive visit.\"  During each visit, the doctor will:   Ask about your physical and mental health   Ask about your habits, behaviors, and lifestyle   Do an exam   Give you vaccines if needed   Talk to you about any medicines you take   Give advice about your health   Answer your questions  Getting regular check-ups is an important part of taking care of your health. It can help your doctor find and treat any problems you have. But it's also important for preventing health problems.  A routine physical is different from a \"sick visit.\" A sick visit is when you see a doctor because of a health concern or problem. Since physicals are scheduled ahead of time, you can think about what you want to ask the doctor.  How often should I get a physical? -- It depends on your age and health. In general, for people age 21 years and older:   If you are younger than 50 years, you might be able to get a physical every 3 years.   If you are 50 years or older, your doctor might recommend a physical every year.  If you have an ongoing health condition, like diabetes or high blood pressure, your doctor will probably want to see you more often.  What happens during a physical? -- In general, each visit will include:   Physical exam - The doctor or nurse will check your height, weight, heart rate, and blood pressure. They will also look at your eyes and ears. They will ask about how you are feeling and whether you have any symptoms that bother you.   Medicines - It's a good idea to bring a list of all the medicines you take to each doctor visit. Your doctor will talk to you about your medicines and answer any questions. Tell them if you are having any side effects that bother you. You " "should also tell them if you are having trouble paying for any of your medicines.   Habits and behaviors - This includes:   Your diet   Your exercise habits   Whether you smoke, drink alcohol, or use drugs   Whether you are sexually active   Whether you feel safe at home  Your doctor will talk to you about things you can do to improve your health and lower your risk of health problems. They will also offer help and support. For example, if you want to quit smoking, they can give you advice and might prescribe medicines. If you want to improve your diet or get more physical activity, they can help you with this, too.   Lab tests, if needed - The tests you get will depend on your age and situation. For example, your doctor might want to check your:   Cholesterol   Blood sugar   Iron level   Vaccines - The recommended vaccines will depend on your age, health, and what vaccines you already had. Vaccines are very important because they can prevent certain serious or deadly infections.   Discussion of screening - \"Screening\" means checking for diseases or other health problems before they cause symptoms. Your doctor can recommend screening based on your age, risk, and preferences. This might include tests to check for:   Cancer, such as breast, prostate, cervical, ovarian, colorectal, prostate, lung, or skin cancer   Sexually transmitted infections, such as chlamydia and gonorrhea   Mental health conditions like depression and anxiety  Your doctor will talk to you about the different types of screening tests. They can help you decide which screenings to have. They can also explain what the results might mean.   Answering questions - The physical is a good time to ask the doctor or nurse questions about your health. If needed, they can refer you to other doctors or specialists, too.  Adults older than 65 years often need other care, too. As you get older, your doctor will talk to you about:   How to prevent falling at " home   Hearing or vision tests   Memory testing   How to take your medicines safely   Making sure that you have the help and support you need at home  All topics are updated as new evidence becomes available and our peer review process is complete.  This topic retrieved from [employer] on: May 02, 2024.  Topic 357830 Version 1.0  Release: 32.4.3 - C32.122  © 2024 UpToDate, Inc. and/or its affiliates. All rights reserved.  Consumer Information Use and Disclaimer   Disclaimer: This generalized information is a limited summary of diagnosis, treatment, and/or medication information. It is not meant to be comprehensive and should be used as a tool to help the user understand and/or assess potential diagnostic and treatment options. It does NOT include all information about conditions, treatments, medications, side effects, or risks that may apply to a specific patient. It is not intended to be medical advice or a substitute for the medical advice, diagnosis, or treatment of a health care provider based on the health care provider's examination and assessment of a patient's specific and unique circumstances. Patients must speak with a health care provider for complete information about their health, medical questions, and treatment options, including any risks or benefits regarding use of medications. This information does not endorse any treatments or medications as safe, effective, or approved for treating a specific patient. UpToDate, Inc. and its affiliates disclaim any warranty or liability relating to this information or the use thereof.The use of this information is governed by the Terms of Use, available at https://www.[employer]uwer.com/en/know/clinical-effectiveness-terms. 2024© UpToDate, Inc. and its affiliates and/or licensors. All rights reserved.  Copyright   © 2024 UpToDate, Inc. and/or its affiliates. All rights reserved.

## 2024-09-16 NOTE — ASSESSMENT & PLAN NOTE
Orders:    Semaglutide-Weight Management (Wegovy) 1 MG/0.5ML; Inject 1 mg under the skin weekly    TSH, 3rd generation with Free T4 reflex; Future    Hemoglobin A1C; Future

## 2024-10-16 DIAGNOSIS — E66.09 CLASS 1 OBESITY DUE TO EXCESS CALORIES WITH SERIOUS COMORBIDITY AND BODY MASS INDEX (BMI) OF 31.0 TO 31.9 IN ADULT: ICD-10-CM

## 2024-10-16 DIAGNOSIS — E66.811 CLASS 1 OBESITY DUE TO EXCESS CALORIES WITH SERIOUS COMORBIDITY AND BODY MASS INDEX (BMI) OF 31.0 TO 31.9 IN ADULT: ICD-10-CM

## 2024-10-17 RX ORDER — SEMAGLUTIDE 1 MG/.5ML
INJECTION, SOLUTION SUBCUTANEOUS
Qty: 2 ML | Refills: 0 | Status: SHIPPED | OUTPATIENT
Start: 2024-10-17

## 2024-10-17 NOTE — TELEPHONE ENCOUNTER
Requested medication(s) are due for refill today: Yes  Patient has already received a courtesy refill: No  Other reason request has been forwarded to provider: per protocol     Patient would jose antonio to stay on current dose.

## 2024-11-27 DIAGNOSIS — E66.811 CLASS 1 OBESITY DUE TO EXCESS CALORIES WITH SERIOUS COMORBIDITY AND BODY MASS INDEX (BMI) OF 31.0 TO 31.9 IN ADULT: Primary | ICD-10-CM

## 2024-11-27 DIAGNOSIS — E66.09 CLASS 1 OBESITY DUE TO EXCESS CALORIES WITH SERIOUS COMORBIDITY AND BODY MASS INDEX (BMI) OF 31.0 TO 31.9 IN ADULT: Primary | ICD-10-CM

## 2024-11-27 RX ORDER — SEMAGLUTIDE 1.7 MG/.75ML
INJECTION, SOLUTION SUBCUTANEOUS
Qty: 3 ML | Refills: 0 | Status: SHIPPED | OUTPATIENT
Start: 2024-11-27

## 2024-12-12 DIAGNOSIS — E66.09 CLASS 1 OBESITY DUE TO EXCESS CALORIES WITH SERIOUS COMORBIDITY AND BODY MASS INDEX (BMI) OF 31.0 TO 31.9 IN ADULT: ICD-10-CM

## 2024-12-12 DIAGNOSIS — E66.811 CLASS 1 OBESITY DUE TO EXCESS CALORIES WITH SERIOUS COMORBIDITY AND BODY MASS INDEX (BMI) OF 31.0 TO 31.9 IN ADULT: ICD-10-CM

## 2024-12-13 RX ORDER — SEMAGLUTIDE 1.7 MG/.75ML
INJECTION, SOLUTION SUBCUTANEOUS
Qty: 3 ML | Refills: 0 | Status: SHIPPED | OUTPATIENT
Start: 2024-12-13

## 2024-12-17 ENCOUNTER — APPOINTMENT (OUTPATIENT)
Dept: LAB | Facility: AMBULARY SURGERY CENTER | Age: 54
End: 2024-12-17
Payer: COMMERCIAL

## 2024-12-17 DIAGNOSIS — R73.01 IMPAIRED FASTING BLOOD SUGAR: ICD-10-CM

## 2024-12-17 DIAGNOSIS — E66.09 CLASS 1 OBESITY DUE TO EXCESS CALORIES WITH SERIOUS COMORBIDITY AND BODY MASS INDEX (BMI) OF 31.0 TO 31.9 IN ADULT: ICD-10-CM

## 2024-12-17 DIAGNOSIS — E66.811 CLASS 1 OBESITY DUE TO EXCESS CALORIES WITH SERIOUS COMORBIDITY AND BODY MASS INDEX (BMI) OF 31.0 TO 31.9 IN ADULT: ICD-10-CM

## 2024-12-17 DIAGNOSIS — E06.3 HYPOTHYROIDISM DUE TO HASHIMOTO'S THYROIDITIS: ICD-10-CM

## 2024-12-17 LAB
EST. AVERAGE GLUCOSE BLD GHB EST-MCNC: 117 MG/DL
HBA1C MFR BLD: 5.7 %
T4 FREE SERPL-MCNC: 1.4 NG/DL (ref 0.61–1.12)
TSH SERPL DL<=0.05 MIU/L-ACNC: 0.05 UIU/ML (ref 0.45–4.5)

## 2024-12-17 PROCEDURE — 36415 COLL VENOUS BLD VENIPUNCTURE: CPT

## 2024-12-17 PROCEDURE — 84439 ASSAY OF FREE THYROXINE: CPT

## 2024-12-17 PROCEDURE — 84443 ASSAY THYROID STIM HORMONE: CPT

## 2024-12-17 PROCEDURE — 83036 HEMOGLOBIN GLYCOSYLATED A1C: CPT

## 2024-12-19 ENCOUNTER — HOSPITAL ENCOUNTER (OUTPATIENT)
Dept: MAMMOGRAPHY | Facility: HOSPITAL | Age: 54
Discharge: HOME | End: 2024-12-19
Payer: COMMERCIAL

## 2024-12-19 VITALS — WEIGHT: 186.95 LBS | BODY MASS INDEX: 29.34 KG/M2 | HEIGHT: 67 IN

## 2024-12-19 DIAGNOSIS — Z12.31 VISIT FOR SCREENING MAMMOGRAM: ICD-10-CM

## 2024-12-19 PROCEDURE — 77063 BREAST TOMOSYNTHESIS BI: CPT

## 2024-12-19 PROCEDURE — 77067 SCR MAMMO BI INCL CAD: CPT

## 2024-12-24 ENCOUNTER — RESULTS FOLLOW-UP (OUTPATIENT)
Dept: OBGYN CLINIC | Facility: CLINIC | Age: 54
End: 2024-12-24

## 2025-01-09 DIAGNOSIS — E66.811 CLASS 1 OBESITY DUE TO EXCESS CALORIES WITH SERIOUS COMORBIDITY AND BODY MASS INDEX (BMI) OF 31.0 TO 31.9 IN ADULT: ICD-10-CM

## 2025-01-09 DIAGNOSIS — E66.09 CLASS 1 OBESITY DUE TO EXCESS CALORIES WITH SERIOUS COMORBIDITY AND BODY MASS INDEX (BMI) OF 31.0 TO 31.9 IN ADULT: ICD-10-CM

## 2025-01-13 RX ORDER — SEMAGLUTIDE 1.7 MG/.75ML
INJECTION, SOLUTION SUBCUTANEOUS
Qty: 3 ML | Refills: 3 | Status: SHIPPED | OUTPATIENT
Start: 2025-01-13

## 2025-01-27 ENCOUNTER — OFFICE VISIT (OUTPATIENT)
Dept: FAMILY MEDICINE CLINIC | Facility: CLINIC | Age: 55
End: 2025-01-27
Payer: COMMERCIAL

## 2025-01-27 ENCOUNTER — TELEPHONE (OUTPATIENT)
Dept: ADMINISTRATIVE | Facility: OTHER | Age: 55
End: 2025-01-27

## 2025-01-27 VITALS
OXYGEN SATURATION: 95 % | WEIGHT: 167 LBS | HEART RATE: 105 BPM | RESPIRATION RATE: 17 BRPM | BODY MASS INDEX: 26.21 KG/M2 | HEIGHT: 67 IN | TEMPERATURE: 96.9 F | SYSTOLIC BLOOD PRESSURE: 120 MMHG | DIASTOLIC BLOOD PRESSURE: 80 MMHG

## 2025-01-27 DIAGNOSIS — R39.9 UTI SYMPTOMS: ICD-10-CM

## 2025-01-27 DIAGNOSIS — E06.3 HYPOTHYROIDISM DUE TO HASHIMOTO'S THYROIDITIS: Primary | ICD-10-CM

## 2025-01-27 DIAGNOSIS — E66.811 CLASS 1 OBESITY DUE TO EXCESS CALORIES WITH SERIOUS COMORBIDITY AND BODY MASS INDEX (BMI) OF 31.0 TO 31.9 IN ADULT: ICD-10-CM

## 2025-01-27 DIAGNOSIS — E66.09 CLASS 1 OBESITY DUE TO EXCESS CALORIES WITH SERIOUS COMORBIDITY AND BODY MASS INDEX (BMI) OF 31.0 TO 31.9 IN ADULT: ICD-10-CM

## 2025-01-27 DIAGNOSIS — D56.1 BETA THALASSEMIA (HCC): ICD-10-CM

## 2025-01-27 DIAGNOSIS — R35.0 FREQUENCY OF URINATION: ICD-10-CM

## 2025-01-27 LAB
SL AMB  POCT GLUCOSE, UA: ABNORMAL
SL AMB LEUKOCYTE ESTERASE,UA: ABNORMAL
SL AMB POCT BILIRUBIN,UA: ABNORMAL
SL AMB POCT BLOOD,UA: ABNORMAL
SL AMB POCT CLARITY,UA: ABNORMAL
SL AMB POCT COLOR,UA: ABNORMAL
SL AMB POCT KETONES,UA: ABNORMAL
SL AMB POCT NITRITE,UA: ABNORMAL
SL AMB POCT PH,UA: 5
SL AMB POCT SPECIFIC GRAVITY,UA: 1.02
SL AMB POCT URINE PROTEIN: ABNORMAL
SL AMB POCT UROBILINOGEN: ABNORMAL

## 2025-01-27 PROCEDURE — 81002 URINALYSIS NONAUTO W/O SCOPE: CPT | Performed by: NURSE PRACTITIONER

## 2025-01-27 PROCEDURE — 87077 CULTURE AEROBIC IDENTIFY: CPT | Performed by: NURSE PRACTITIONER

## 2025-01-27 PROCEDURE — 99214 OFFICE O/P EST MOD 30 MIN: CPT | Performed by: NURSE PRACTITIONER

## 2025-01-27 PROCEDURE — 87086 URINE CULTURE/COLONY COUNT: CPT | Performed by: NURSE PRACTITIONER

## 2025-01-27 PROCEDURE — 87186 SC STD MICRODIL/AGAR DIL: CPT | Performed by: NURSE PRACTITIONER

## 2025-01-27 RX ORDER — LEVOTHYROXINE SODIUM 112 UG/1
112 TABLET ORAL
Qty: 100 TABLET | Refills: 3 | Status: SHIPPED | OUTPATIENT
Start: 2025-01-27

## 2025-01-27 RX ORDER — NITROFURANTOIN 25; 75 MG/1; MG/1
100 CAPSULE ORAL 2 TIMES DAILY
Qty: 10 CAPSULE | Refills: 0 | Status: SHIPPED | OUTPATIENT
Start: 2025-01-27 | End: 2025-02-01

## 2025-01-27 NOTE — ASSESSMENT & PLAN NOTE
Stable              Recent Results (from the past week)   POCT urine dip    Collection Time: 01/27/25  5:00 PM   Result Value Ref Range    LEUKOCYTE ESTERASE,UA 3+     NITRITE,UA +     SL AMB POCT UROBILINOGEN 3+     POCT URINE PROTEIN 1+      PH,UA 5.0     BLOOD,UA 2+     SPECIFIC GRAVITY,UA 1.025     KETONES,UA 1+     BILIRUBIN,UA 3+     GLUCOSE, UA 1+      COLOR,UA orange     CLARITY,UA dark

## 2025-01-27 NOTE — TELEPHONE ENCOUNTER
----- Message from Joanna SCOTT sent at 1/27/2025  8:10 AM EST -----  Regarding: Care Gap Request  01/27/25 8:10 AM    Hello, our patient above has had Pap Smear (HPV) aka Cervical Cancer Screening completed/performed. Please assist in updating the patient chart by pulling report from media. The date of service is 02/15/2023.     Thank you,  JACKLYN Copeland [facility]

## 2025-01-27 NOTE — TELEPHONE ENCOUNTER
Upon review of the In Basket request we were able to locate, review, and update the patient chart as requested for Pap Smear (HPV) aka Cervical Cancer Screening.    Any additional questions or concerns should be emailed to the Practice Liaisons via the appropriate education email address, please do not reply via In Basket.    Thank you  Lou Guzman MA   [facility]

## 2025-01-27 NOTE — PROGRESS NOTES
Name: Shital Abbasi      : 1970      MRN: [id number]  Encounter Provider: NEENA Mccann  Encounter Date: 2025   Encounter department: [facility]    Assessment & Plan  Hypothyroidism due to Hashimoto's thyroiditis    Orders:    levothyroxine 112 mcg tablet; Take 1 tablet (112 mcg total) by mouth daily in the early morning    TSH, 3rd generation with Free T4 reflex; Future    Frequency of urination    Orders:    POCT urine dip    Urine culture    UTI symptoms    Orders:    nitrofurantoin (MACROBID) 100 mg capsule; Take 1 capsule (100 mg total) by mouth 2 (two) times a day for 5 days    Class 1 obesity due to excess calories with serious comorbidity and body mass index (BMI) of 31.0 to 31.9 in adult  Cont current meds           Beta thalassemia (HCC)  Stable              Recent Results (from the past week)   POCT urine dip    Collection Time: 25  5:00 PM   Result Value Ref Range    LEUKOCYTE ESTERASE,UA 3+     NITRITE,UA +     SL AMB POCT UROBILINOGEN 3+     POCT URINE PROTEIN 1+      PH,UA 5.0     BLOOD,UA 2+     SPECIFIC GRAVITY,UA 1.025     KETONES,UA 1+     BILIRUBIN,UA 3+     GLUCOSE, UA 1+      COLOR,UA orange     CLARITY,UA dark        BMI Counseling: Body mass index is 26.55 kg/m². The BMI is above normal. Exercise recommendations include exercising 3-5 times per week and strength training exercises.     Depression Screening and Follow-up Plan: Patient was screened for depression during today's encounter. They screened negative with a PHQ-2 score of 0.        History of Present Illness     Here for f/u to chronic medical conditions  Labs reviewed  Lost 20 pounds  Tsh too low  Will need to lower levothyroxine again  Last pap in         Review of Systems   Constitutional:  Negative for fatigue and fever.   HENT:  Negative for congestion, postnasal drip and rhinorrhea.    Eyes:  Negative for photophobia and visual disturbance.   Respiratory:  Negative for cough,  shortness of breath and wheezing.    Cardiovascular:  Negative for chest pain and palpitations.   Gastrointestinal:  Negative for constipation, diarrhea, nausea and vomiting.   Genitourinary:  Positive for dysuria, frequency and urgency.   Musculoskeletal:  Negative for arthralgias and myalgias.   Skin:  Negative for rash.   Neurological:  Negative for dizziness, light-headedness and headaches.   Hematological:  Negative for adenopathy.   Psychiatric/Behavioral:  Negative for dysphoric mood and sleep disturbance. The patient is not nervous/anxious.      Past Medical History:   Diagnosis Date    Anemia     Beta thallasemia    Asthma     Exercise induced    Disease of thyroid gland 1999    Hypothyroidism    GERD (gastroesophageal reflux disease)     Intermittently    Obesity     Pap smear for cervical cancer screening     denies h.o abnormal; 2/2023-wnl, HRHPV neg     Past Surgical History:   Procedure Laterality Date    BLADDER SURGERY  2012    Bladder lift  and rectocele repair    COLONOSCOPY      2021-wnl, repeat 10 years    KNEE SURGERY Left     Arthroscopy    WISDOM TOOTH EXTRACTION       Family History   Problem Relation Age of Onset    Diabetes Mother         Type 2    COPD Mother     Completed Suicide  Father         GSW    Hashimoto's thyroiditis Sister     Hypertension Brother         pulmonary HTN    COPD Brother     Breast cancer Maternal Grandmother 80    No Known Problems Maternal Grandfather     No Known Problems Paternal Grandmother     No Known Problems Paternal Grandfather     Thalassemia Daughter     Thalassemia Daughter     Ovarian cancer Neg Hx     Colon cancer Neg Hx      Social History     Tobacco Use    Smoking status: Never    Smokeless tobacco: Never   Vaping Use    Vaping status: Never Used   Substance and Sexual Activity    Alcohol use: Not Currently     Alcohol/week: 0.0 - 1.0 standard drinks of alcohol     Comment: Socially-once monthly    Drug use: Never    Sexual activity: Yes      "Partners: Male     Birth control/protection: Male Sterilization     Comment: lifetime partners: 1     Current Outpatient Medications on File Prior to Visit   Medication Sig    ALBUTEROL IN     B Complex Vitamins (B COMPLEX 1 PO)     Cholecalciferol (Dialyvite Vitamin D 5000) 125 MCG (5000 UT) capsule 5,000 Units    Doxylamine Succinate, Sleep, (UNISOM PO)     Semaglutide-Weight Management (Wegovy) 1.7 MG/0.75ML Inject 1.7 mg under the skin weekly    [DISCONTINUED] levothyroxine (Synthroid) 125 mcg tablet Take 1 tablet (125 mcg total) by mouth every 7 days One day of the week 62.5 mcg  extra    [DISCONTINUED] Semaglutide-Weight Management (Wegovy) 1 MG/0.5ML Inject 1 mg under the skin weekly     No Known Allergies  Immunization History   Administered Date(s) Administered    COVID-19 MODERNA VACC 0.5 ML IM 04/01/2022    COVID-19 Moderna Vac BIVALENT 12 Yr+ IM 0.5 ML 01/05/2023    COVID-19 PFIZER VACCINE 0.3 ML IM 12/23/2020, 01/15/2021, 10/01/2021    COVID-19 Pfizer mRNA vacc PF petra-sucrose 12 yr and older (Comirnaty) 12/14/2023    H1N1, All Formulations 11/22/2009    INFLUENZA 10/17/2014, 10/01/2016, 10/17/2017, 10/08/2018, 10/11/2019, 10/07/2020, 10/20/2021, 10/28/2022, 10/26/2023, 10/08/2024    Tdap 02/03/2009    Zoster Vaccine Recombinant 11/13/2021, 11/07/2023     Objective   /80 (BP Location: Left arm, Patient Position: Sitting, Cuff Size: Standard)   Pulse 105   Temp (!) 96.9 °F (36.1 °C) (Tympanic)   Resp 17   Ht 5' 6.5\" (1.689 m)   Wt 75.8 kg (167 lb)   LMP 07/11/2023 (Exact Date)   SpO2 95%   BMI 26.55 kg/m²     BMI Counseling: Body mass index is 26.55 kg/m². The BMI is above normal. Nutrition recommendations include reducing portion sizes, decreasing overall calorie intake, 3-5 servings of fruits/vegetables daily, reducing fast food intake, consuming healthier snacks, decreasing soda and/or juice intake, moderation in carbohydrate intake, increasing intake of lean protein, reducing intake of " saturated fat and trans fat, and reducing intake of cholesterol. Exercise recommendations include moderate aerobic physical activity for 150 minutes/week, exercising 3-5 times per week, and strength training exercises.   Physical Exam  Vitals and nursing note reviewed.   Constitutional:       Appearance: Normal appearance.   HENT:      Head: Normocephalic and atraumatic.      Right Ear: Tympanic membrane, ear canal and external ear normal.      Left Ear: Tympanic membrane, ear canal and external ear normal.      Nose: Nose normal.      Mouth/Throat:      Mouth: Mucous membranes are moist.   Eyes:      Extraocular Movements: Extraocular movements intact.      Conjunctiva/sclera: Conjunctivae normal.   Cardiovascular:      Rate and Rhythm: Normal rate and regular rhythm.      Heart sounds: Normal heart sounds.   Pulmonary:      Effort: Pulmonary effort is normal.      Breath sounds: Normal breath sounds.   Abdominal:      General: Bowel sounds are normal.      Palpations: Abdomen is soft.   Musculoskeletal:         General: Normal range of motion.      Cervical back: Normal range of motion and neck supple.   Skin:     General: Skin is warm and dry.      Capillary Refill: Capillary refill takes less than 2 seconds.   Neurological:      General: No focal deficit present.      Mental Status: She is alert and oriented to person, place, and time.   Psychiatric:         Mood and Affect: Mood normal.         Behavior: Behavior normal.         Thought Content: Thought content normal.

## 2025-01-27 NOTE — ASSESSMENT & PLAN NOTE
Orders:    levothyroxine 112 mcg tablet; Take 1 tablet (112 mcg total) by mouth daily in the early morning    TSH, 3rd generation with Free T4 reflex; Future

## 2025-01-29 LAB — BACTERIA UR CULT: ABNORMAL

## 2025-01-30 ENCOUNTER — RESULTS FOLLOW-UP (OUTPATIENT)
Dept: FAMILY MEDICINE CLINIC | Facility: CLINIC | Age: 55
End: 2025-01-30

## 2025-02-12 DIAGNOSIS — E66.811 CLASS 1 OBESITY DUE TO EXCESS CALORIES WITH SERIOUS COMORBIDITY AND BODY MASS INDEX (BMI) OF 31.0 TO 31.9 IN ADULT: ICD-10-CM

## 2025-02-12 DIAGNOSIS — E66.09 CLASS 1 OBESITY DUE TO EXCESS CALORIES WITH SERIOUS COMORBIDITY AND BODY MASS INDEX (BMI) OF 31.0 TO 31.9 IN ADULT: ICD-10-CM

## 2025-02-12 NOTE — TELEPHONE ENCOUNTER
Requested medication(s) are due for refill today: Yes  Patient has already received a courtesy refill: No  Other reason request has been forwarded to provider: per protocol. Patient wants to stay on this dose.

## 2025-02-14 RX ORDER — SEMAGLUTIDE 1.7 MG/.75ML
INJECTION, SOLUTION SUBCUTANEOUS
Qty: 3 ML | Refills: 5 | Status: SHIPPED | OUTPATIENT
Start: 2025-02-14

## 2025-03-12 DIAGNOSIS — E66.811 CLASS 1 OBESITY DUE TO EXCESS CALORIES WITH SERIOUS COMORBIDITY AND BODY MASS INDEX (BMI) OF 31.0 TO 31.9 IN ADULT: ICD-10-CM

## 2025-03-12 DIAGNOSIS — E66.09 CLASS 1 OBESITY DUE TO EXCESS CALORIES WITH SERIOUS COMORBIDITY AND BODY MASS INDEX (BMI) OF 31.0 TO 31.9 IN ADULT: ICD-10-CM

## 2025-03-12 NOTE — TELEPHONE ENCOUNTER
Requested medication(s) are due for refill today: Yes  Patient has already received a courtesy refill: No  Other reason request has been forwarded to provider: per protocol     Patient would like to stay on current dose

## 2025-03-13 RX ORDER — SEMAGLUTIDE 1.7 MG/.75ML
INJECTION, SOLUTION SUBCUTANEOUS
Qty: 3 ML | Refills: 5 | Status: SHIPPED | OUTPATIENT
Start: 2025-03-13

## 2025-04-02 ENCOUNTER — APPOINTMENT (OUTPATIENT)
Dept: LAB | Facility: AMBULARY SURGERY CENTER | Age: 55
End: 2025-04-02
Payer: COMMERCIAL

## 2025-04-02 DIAGNOSIS — E06.3 HYPOTHYROIDISM DUE TO HASHIMOTO'S THYROIDITIS: ICD-10-CM

## 2025-04-02 DIAGNOSIS — Z00.8 HEALTH EXAMINATION IN POPULATION SURVEYS: ICD-10-CM

## 2025-04-02 LAB
CHOLEST SERPL-MCNC: 156 MG/DL (ref ?–200)
EST. AVERAGE GLUCOSE BLD GHB EST-MCNC: 120 MG/DL
HBA1C MFR BLD: 5.8 %
HDLC SERPL-MCNC: 64 MG/DL
LDLC SERPL CALC-MCNC: 77 MG/DL (ref 0–100)
NONHDLC SERPL-MCNC: 92 MG/DL
T4 FREE SERPL-MCNC: 1.29 NG/DL (ref 0.61–1.12)
TRIGL SERPL-MCNC: 75 MG/DL (ref ?–150)
TSH SERPL DL<=0.05 MIU/L-ACNC: 0.02 UIU/ML (ref 0.45–4.5)

## 2025-04-02 PROCEDURE — 84443 ASSAY THYROID STIM HORMONE: CPT

## 2025-04-02 PROCEDURE — 80061 LIPID PANEL: CPT

## 2025-04-02 PROCEDURE — 84439 ASSAY OF FREE THYROXINE: CPT

## 2025-04-02 PROCEDURE — 36415 COLL VENOUS BLD VENIPUNCTURE: CPT

## 2025-04-02 PROCEDURE — 83036 HEMOGLOBIN GLYCOSYLATED A1C: CPT

## 2025-04-06 DIAGNOSIS — E06.3 HYPOTHYROIDISM DUE TO HASHIMOTO'S THYROIDITIS: Primary | ICD-10-CM

## 2025-04-06 RX ORDER — LEVOTHYROXINE SODIUM 100 UG/1
100 TABLET ORAL
Qty: 100 TABLET | Refills: 3 | Status: SHIPPED | OUTPATIENT
Start: 2025-04-06

## 2025-04-10 DIAGNOSIS — E66.09 CLASS 1 OBESITY DUE TO EXCESS CALORIES WITH SERIOUS COMORBIDITY AND BODY MASS INDEX (BMI) OF 31.0 TO 31.9 IN ADULT: ICD-10-CM

## 2025-04-10 DIAGNOSIS — E66.811 CLASS 1 OBESITY DUE TO EXCESS CALORIES WITH SERIOUS COMORBIDITY AND BODY MASS INDEX (BMI) OF 31.0 TO 31.9 IN ADULT: ICD-10-CM

## 2025-04-11 ENCOUNTER — RESULTS FOLLOW-UP (OUTPATIENT)
Dept: FAMILY MEDICINE CLINIC | Facility: CLINIC | Age: 55
End: 2025-04-11

## 2025-04-11 RX ORDER — SEMAGLUTIDE 1.7 MG/.75ML
INJECTION, SOLUTION SUBCUTANEOUS
Qty: 3 ML | Refills: 3 | Status: SHIPPED | OUTPATIENT
Start: 2025-04-11

## 2025-07-09 ENCOUNTER — APPOINTMENT (OUTPATIENT)
Dept: LAB | Age: 55
End: 2025-07-09
Payer: COMMERCIAL

## 2025-07-09 DIAGNOSIS — E06.3 HYPOTHYROIDISM DUE TO HASHIMOTO'S THYROIDITIS: ICD-10-CM

## 2025-07-09 LAB
T4 FREE SERPL-MCNC: 1.27 NG/DL (ref 0.61–1.12)
TSH SERPL DL<=0.05 MIU/L-ACNC: 0.17 UIU/ML (ref 0.45–4.5)

## 2025-07-09 PROCEDURE — 84439 ASSAY OF FREE THYROXINE: CPT

## 2025-07-09 PROCEDURE — 36415 COLL VENOUS BLD VENIPUNCTURE: CPT

## 2025-07-09 PROCEDURE — 84443 ASSAY THYROID STIM HORMONE: CPT

## 2025-07-11 DIAGNOSIS — E66.09 CLASS 1 OBESITY DUE TO EXCESS CALORIES WITH SERIOUS COMORBIDITY AND BODY MASS INDEX (BMI) OF 31.0 TO 31.9 IN ADULT: ICD-10-CM

## 2025-07-11 DIAGNOSIS — E66.811 CLASS 1 OBESITY DUE TO EXCESS CALORIES WITH SERIOUS COMORBIDITY AND BODY MASS INDEX (BMI) OF 31.0 TO 31.9 IN ADULT: ICD-10-CM

## 2025-07-14 RX ORDER — SEMAGLUTIDE 1.7 MG/.75ML
INJECTION, SOLUTION SUBCUTANEOUS
Qty: 3 ML | Refills: 5 | Status: SHIPPED | OUTPATIENT
Start: 2025-07-14 | End: 2025-07-16

## 2025-07-16 ENCOUNTER — TELEPHONE (OUTPATIENT)
Age: 55
End: 2025-07-16

## 2025-07-16 DIAGNOSIS — E66.09 CLASS 1 OBESITY DUE TO EXCESS CALORIES WITH SERIOUS COMORBIDITY AND BODY MASS INDEX (BMI) OF 31.0 TO 31.9 IN ADULT: ICD-10-CM

## 2025-07-16 DIAGNOSIS — E06.3 HYPOTHYROIDISM DUE TO HASHIMOTO THYROIDITIS: Primary | ICD-10-CM

## 2025-07-16 DIAGNOSIS — E66.811 CLASS 1 OBESITY DUE TO EXCESS CALORIES WITH SERIOUS COMORBIDITY AND BODY MASS INDEX (BMI) OF 31.0 TO 31.9 IN ADULT: ICD-10-CM

## 2025-07-16 RX ORDER — SEMAGLUTIDE 1 MG/.5ML
INJECTION, SOLUTION SUBCUTANEOUS
Qty: 2 ML | Refills: 5 | Status: SHIPPED | OUTPATIENT
Start: 2025-07-16

## 2025-07-16 RX ORDER — LEVOTHYROXINE SODIUM 75 UG/1
75 TABLET ORAL
Qty: 100 TABLET | Refills: 3 | Status: SHIPPED | OUTPATIENT
Start: 2025-07-16

## 2025-07-16 NOTE — TELEPHONE ENCOUNTER
PA for (Wegovy) 1.7 MG/0.75MLSUBMITTED to [employer]     via      [x][employer]-Case ID # [id number]    [x]PA sent as URGENT    All office notes, labs and other pertaining documents and studies sent. Clinical questions answered. Awaiting determination from insurance company.     Turnaround time for your insurance to make a decision on your Prior Authorization can take 7-21 business days.

## 2025-07-21 ENCOUNTER — TELEPHONE (OUTPATIENT)
Dept: FAMILY MEDICINE CLINIC | Facility: CLINIC | Age: 55
End: 2025-07-21

## 2025-08-12 ENCOUNTER — OFFICE VISIT (OUTPATIENT)
Dept: FAMILY MEDICINE CLINIC | Facility: CLINIC | Age: 55
End: 2025-08-12
Payer: COMMERCIAL

## 2025-08-16 ENCOUNTER — HOSPITAL ENCOUNTER (OUTPATIENT)
Dept: ULTRASOUND IMAGING | Facility: HOSPITAL | Age: 55
Discharge: HOME | End: 2025-08-16
Payer: COMMERCIAL

## 2025-08-16 DIAGNOSIS — E06.3 HYPOTHYROIDISM DUE TO HASHIMOTO'S THYROIDITIS: ICD-10-CM

## 2025-08-16 PROCEDURE — 76536 US EXAM OF HEAD AND NECK: CPT

## 2025-08-21 ENCOUNTER — OFFICE VISIT (OUTPATIENT)
Dept: URGENT CARE | Age: 55
End: 2025-08-21
Payer: COMMERCIAL

## 2025-08-21 VITALS
OXYGEN SATURATION: 99 % | HEART RATE: 100 BPM | SYSTOLIC BLOOD PRESSURE: 143 MMHG | TEMPERATURE: 98.9 F | DIASTOLIC BLOOD PRESSURE: 81 MMHG | RESPIRATION RATE: 18 BRPM

## 2025-08-21 DIAGNOSIS — R30.0 DYSURIA: ICD-10-CM

## 2025-08-21 DIAGNOSIS — N39.0 URINARY TRACT INFECTION WITHOUT HEMATURIA, SITE UNSPECIFIED: Primary | ICD-10-CM

## 2025-08-21 PROCEDURE — 99213 OFFICE O/P EST LOW 20 MIN: CPT | Performed by: STUDENT IN AN ORGANIZED HEALTH CARE EDUCATION/TRAINING PROGRAM

## 2025-08-21 PROCEDURE — 87086 URINE CULTURE/COLONY COUNT: CPT | Performed by: STUDENT IN AN ORGANIZED HEALTH CARE EDUCATION/TRAINING PROGRAM

## 2025-08-21 RX ORDER — NITROFURANTOIN 25; 75 MG/1; MG/1
100 CAPSULE ORAL 2 TIMES DAILY
Qty: 10 CAPSULE | Refills: 0 | Status: SHIPPED | OUTPATIENT
Start: 2025-08-21 | End: 2025-08-26

## 2025-08-22 LAB — BACTERIA UR CULT: NORMAL
